# Patient Record
Sex: FEMALE | Race: WHITE
[De-identification: names, ages, dates, MRNs, and addresses within clinical notes are randomized per-mention and may not be internally consistent; named-entity substitution may affect disease eponyms.]

---

## 2017-01-10 ENCOUNTER — HOSPITAL ENCOUNTER (OUTPATIENT)
Dept: HOSPITAL 45 - C.RDSM | Age: 22
Discharge: HOME | End: 2017-01-10
Attending: ORTHOPAEDIC SURGERY
Payer: COMMERCIAL

## 2017-01-10 DIAGNOSIS — M54.2: Primary | ICD-10-CM

## 2017-06-09 ENCOUNTER — HOSPITAL ENCOUNTER (OUTPATIENT)
Dept: HOSPITAL 45 - C.LABSPEC | Age: 22
Discharge: HOME | End: 2017-06-09
Attending: PHYSICIAN ASSISTANT
Payer: COMMERCIAL

## 2017-06-09 DIAGNOSIS — N93.0: Primary | ICD-10-CM

## 2017-06-12 LAB
CHLAMYDIA TRACH RNA***: NOT DETECTED
GC (NEIS GONORRHOEAE)RNA**: NOT DETECTED

## 2017-09-15 ENCOUNTER — HOSPITAL ENCOUNTER (OUTPATIENT)
Dept: HOSPITAL 45 - C.RDSM | Age: 22
Discharge: HOME | End: 2017-09-15
Attending: FAMILY MEDICINE
Payer: COMMERCIAL

## 2017-09-15 DIAGNOSIS — M54.5: Primary | ICD-10-CM

## 2017-09-15 NOTE — DIAGNOSTIC IMAGING REPORT
LUMBAR SPINE MIN 4 VIEWS



HISTORY: Pain  ACUTE LOW BACK PAIN



COMPARISON: 9/17/2015



FINDINGS: Unchanged compared to the prior study. Minimal degenerative vertebral

this change L4-L5. Small limbus vertebra anterior superior endplate L5

considered an anatomic variant.



Posterior elements are intact throughout. No subluxation.    



IMPRESSION:  

Minimal degenerative disc change L4-L5. No change from the prior exam. No acute

process.











The above report was generated using voice recognition software.  It may contain

grammatical, syntax or spelling errors.







Electronically signed by:  Ck Elder M.D.

9/15/2017 10:40 AM



Dictated Date/Time:  9/15/2017 10:39 AM

## 2018-01-27 ENCOUNTER — HOSPITAL ENCOUNTER (OUTPATIENT)
Dept: HOSPITAL 45 - C.RAD | Age: 23
Discharge: HOME | End: 2018-01-27
Attending: ORTHOPAEDIC SURGERY
Payer: COMMERCIAL

## 2018-01-27 DIAGNOSIS — M25.571: Primary | ICD-10-CM

## 2018-01-27 DIAGNOSIS — R93.7: ICD-10-CM

## 2018-01-27 NOTE — DIAGNOSTIC IMAGING REPORT
RIGHT ANKLE 3 VIEWS



HISTORY: Right ankle  PAIN



COMPARISON: None.



FINDINGS: No fracture or dislocation within the right ankle. Tiny ossific

density along the dorsal aspect of the navicular bone consistent with an

age-indeterminate avulsion injury. Soft tissues are unremarkable. No radiopaque

foreign bodies.



IMPRESSION:  



1. No acute fracture or dislocation within the right ankle.

2. Tiny ossific density along the dorsal aspect of the navicular bone consistent

with an age-indeterminate avulsion injury.







Electronically signed by:  Madan Reyes M.D.

1/27/2018 7:20 PM



Dictated Date/Time:  1/27/2018 7:18 PM

## 2018-01-29 ENCOUNTER — HOSPITAL ENCOUNTER (OUTPATIENT)
Dept: HOSPITAL 45 - C.RDSM | Age: 23
Discharge: HOME | End: 2018-01-29
Attending: FAMILY MEDICINE
Payer: COMMERCIAL

## 2018-01-29 DIAGNOSIS — M79.672: ICD-10-CM

## 2018-01-29 DIAGNOSIS — M85.872: Primary | ICD-10-CM

## 2018-01-29 NOTE — DIAGNOSTIC IMAGING REPORT
R FOOT MIN 3 VIEWS



CLINICAL HISTORY: Acute right foot pain following injury.    



COMPARISON: Right ankle radiographs January 27, 2018.



FINDINGS:  Tarsometatarsal joints are intact. A 3 mm ossific density along the

dorsal navicular is noted. There is also subtle cortical irregularity of the

dorsal navicular. No additional fractures are identified on this examination.

There are bipartite medial and lateral sesamoids of the right great toe.



IMPRESSION: 



1. 3 mm ossific density along the dorsal navicular which represents an age

indeterminate avulsion fracture. In addition, cortical irregularity of the

dorsal navicular raises the possibility of an avulsion injury.



2. Bipartite medial and lateral sesamoids of the right great toe.







Electronically signed by:  Heriberto Cardenas M.D.

1/29/2018 10:40 AM



Dictated Date/Time:  1/29/2018 10:25 AM